# Patient Record
(demographics unavailable — no encounter records)

---

## 2025-06-05 NOTE — END OF VISIT
[] : Resident [FreeTextEntry3] : 29 yo female with no sig PMHX here for chest pressure for 5 days.  1) chest pressure -  x 1 week, intermittent, non exertional, no SOB. atypical wells 0 likely 2/2 MSK vs anxiety vs gerd EKG today, lipid panel today

## 2025-06-05 NOTE — HEALTH RISK ASSESSMENT
[Little interest or pleasure doing things] : 1) Little interest or pleasure doing things [Feeling down, depressed, or hopeless] : 2) Feeling down, depressed, or hopeless [0] : 2) Feeling down, depressed, or hopeless: Not at all (0) [PHQ-9 Negative - No further assessment needed] : PHQ-9 Negative - No further assessment needed [Never] : Never [[id number]] : 0

## 2025-06-05 NOTE — END OF VISIT
[] : Resident [FreeTextEntry3] : 31 yo female with no sig PMHX here for chest pressure for 5 days.  1) chest pressure -  x 1 week, intermittent, non exertional, no SOB. atypical wells 0 likely 2/2 MSK vs anxiety vs gerd EKG today, lipid panel today

## 2025-06-05 NOTE — ASSESSMENT
[Vaccines Reviewed] : Immunizations reviewed today. Please see immunization details in the vaccine log within the immunization flowsheet.  [FreeTextEntry1] : #Chest pressure One week, no clear inciting factor but her risk stratification places her at the lowest risk. Wells criteria is 0. EKG has a mild abnormality with some conduction delay and some irregularity in the rate.  - Repeat EKG today - Cardiology referral - Lipid panel for future risk stratification - CBC to check for leukocytosis ?Pericarditis  #History of OLGA Patient had a post-partum hemorrhage around 18 months ago complicating her pregnancy not requiring blood transfusion, but patient was recommended for iron transfusion since she was already baseline anemic during her pregnancy. She did not ultimately receive it and thus is unsure if she is still anemic. Unsure of her prior Hgb or iron studies.  - Will get ferritin, serum iron, and TIBC to evaluate now  #HCM - Vaccinations UTD - Sees gynecologist/midwife for PAP smears, last one negative recently due in 5 years  Discussed with Dr. Trejo

## 2025-06-05 NOTE — HISTORY OF PRESENT ILLNESS
[FreeTextEntry1] : pt here to establish care [de-identified] : Patient is a 30 y.o. female no pmhx here for chest pressure/pain for the last 5 days, she noticed first on thursday, worsened, went to  who did a chest X-ray and EKG and wanted the patient to see a cardiologist as soon as possible. EKG showed some right bundle physiology and an irregular rhythm. Denies any other complaints. No dyspnea on exertion, no edema, no other associated symptoms. Nothing makes the pain better or worse. Not related to food. Pt is currently breastfeeding with an 18 month old.

## 2025-06-05 NOTE — HISTORY OF PRESENT ILLNESS
[FreeTextEntry1] : pt here to establish care [de-identified] : Patient is a 30 y.o. female no pmhx here for chest pressure/pain for the last 5 days, she noticed first on thursday, worsened, went to  who did a chest X-ray and EKG and wanted the patient to see a cardiologist as soon as possible. EKG showed some right bundle physiology and an irregular rhythm. Denies any other complaints. No dyspnea on exertion, no edema, no other associated symptoms. Nothing makes the pain better or worse. Not related to food. Pt is currently breastfeeding with an 18 month old.

## 2025-06-05 NOTE — HEALTH RISK ASSESSMENT
[Little interest or pleasure doing things] : 1) Little interest or pleasure doing things [Feeling down, depressed, or hopeless] : 2) Feeling down, depressed, or hopeless [0] : 2) Feeling down, depressed, or hopeless: Not at all (0) [PHQ-9 Negative - No further assessment needed] : PHQ-9 Negative - No further assessment needed [Never] : Never [[id number]] : 0

## 2025-06-12 NOTE — PHYSICAL EXAM
[No Respiratory Distress] : no respiratory distress [No Acc Muscle Use] : no accessory muscle use [No Masses] : no abdominal mass palpated [Respiration, Rhythm And Depth] : normal respiratory rhythm and effort [Abdomen Soft] : soft [RLQ] : RLQ [Normal] : oriented to person, place, and time

## 2025-06-13 NOTE — END OF VISIT
[] : Fellow [Time Spent: ___ minutes] : I have spent [unfilled] minutes of time on the encounter which excludes teaching and separately reported services. [FreeTextEntry3] : The patient was seen and examined at [address] I agree w/ above with the following changes.  Pt w/ acute RLQ pain w/ low grade fever. Tender to palpation but no guarding or rebound. Given acute findings and fever, sent to ED for c/f appendicitis. Discussed w/ patient at length.

## 2025-06-13 NOTE — ASSESSMENT
[FreeTextEntry1] : 29 yo F with acute RLQ pain, low grade temp and several weeks of chest pain/epigastric discomfort.  #RLQ pain: TTP on exam with low grade temp raises concern for acute appendicitis. Cannot r/o  etiologies as well  Recommend ED eval for imaging, labs and complete eval  -[facility] ED called; pt sent for eval  #Chest pain/epigastric discomfort:  Possible reflux. No red flags. Preceded RLQ pain.   -H. pylori testing once above has been completely evaluated; can be done as outpt -Trial of PPI discussed; pt prefers to trial alternative therapies which we reviewed -Will focus on RLQ pain and f/u with results of H. pylori to discuss next steps     Ronal Dneson DO GI Fellow [facility]  The above recommendations were discussed at the time of the visit with the co-signing attending. Please see attending addendum for any additional recommendations.

## 2025-06-13 NOTE — ASSESSMENT
[FreeTextEntry1] : 31 yo F with acute RLQ pain, low grade temp and several weeks of chest pain/epigastric discomfort.  #RLQ pain: TTP on exam with low grade temp raises concern for acute appendicitis. Cannot r/o  etiologies as well  Recommend ED eval for imaging, labs and complete eval  -[facility] ED called; pt sent for eval  #Chest pain/epigastric discomfort:  Possible reflux. No red flags. Preceded RLQ pain.   -H. pylori testing once above has been completely evaluated; can be done as outpt -Trial of PPI discussed; pt prefers to trial alternative therapies which we reviewed -Will focus on RLQ pain and f/u with results of H. pylori to discuss next steps     Ronal Denson DO GI Fellow [facility]  The above recommendations were discussed at the time of the visit with the co-signing attending. Please see attending addendum for any additional recommendations.

## 2025-06-13 NOTE — HISTORY OF PRESENT ILLNESS
[FreeTextEntry1] : 31 yo F who presents today to evaluate 2 days of RLQ pain  Recalls waking up from sleep with RLQ pain and went back to sleep and sx seemed to have resolved however last night experienced same pain in RLQ which woke her up from sleep and persisted.    No associated changes with bowel movements. No blood in stool.  Earlier today noted she has a 100 F temp based on temporal reading at home. No rigors.  No nausea/vomiting  No  sx.    Has also been experiencing about 2 weeks of chest pain with radiation to epigastrium which she describes as pressure. Seen by PCP and referred to cardiology for evaluation.  Feels sx are persistent and unchanged with position. Worsened to some degree after she ate french fries. Has not tried OTC therapies as she is breast feeding.  No dysphagia/odynophagia.  No prior EGD/Colonoscopy  PMhx: None Medications: None Surgical hx: None Family hx: No family hx of GI disease Allergies: NKDA Social Hx:  Non smoker. Occasional ETOH